# Patient Record
Sex: FEMALE | Race: BLACK OR AFRICAN AMERICAN | Employment: OTHER | URBAN - METROPOLITAN AREA
[De-identification: names, ages, dates, MRNs, and addresses within clinical notes are randomized per-mention and may not be internally consistent; named-entity substitution may affect disease eponyms.]

---

## 2017-06-14 ENCOUNTER — HOSPITAL ENCOUNTER (EMERGENCY)
Age: 82
Discharge: HOME | End: 2017-06-14
Attending: EMERGENCY MEDICINE
Payer: MEDICARE

## 2017-06-14 ENCOUNTER — APPOINTMENT (OUTPATIENT)
Dept: CT IMAGING | Age: 82
End: 2017-06-14
Attending: EMERGENCY MEDICINE
Payer: MEDICARE

## 2017-06-14 ENCOUNTER — APPOINTMENT (OUTPATIENT)
Dept: GENERAL RADIOLOGY | Age: 82
End: 2017-06-14
Attending: EMERGENCY MEDICINE
Payer: MEDICARE

## 2017-06-14 VITALS
SYSTOLIC BLOOD PRESSURE: 146 MMHG | OXYGEN SATURATION: 97 % | DIASTOLIC BLOOD PRESSURE: 65 MMHG | HEART RATE: 57 BPM | BODY MASS INDEX: 30.12 KG/M2 | HEIGHT: 63 IN | WEIGHT: 170 LBS | TEMPERATURE: 98.6 F | RESPIRATION RATE: 14 BRPM

## 2017-06-14 DIAGNOSIS — R53.82 CHRONIC FATIGUE: Primary | ICD-10-CM

## 2017-06-14 LAB
ALBUMIN SERPL BCP-MCNC: 2.9 G/DL (ref 3.2–4.6)
ALBUMIN/GLOB SERPL: 0.6 {RATIO} (ref 1.2–3.5)
ALP SERPL-CCNC: 116 U/L (ref 50–136)
ALT SERPL-CCNC: 23 U/L (ref 12–65)
ANION GAP BLD CALC-SCNC: 8 MMOL/L (ref 7–16)
APTT PPP: 25.9 SEC (ref 23.5–31.7)
AST SERPL W P-5'-P-CCNC: 36 U/L (ref 15–37)
ATRIAL RATE: 77 BPM
BASOPHILS # BLD AUTO: 0 K/UL (ref 0–0.2)
BASOPHILS # BLD: 0 % (ref 0–2)
BILIRUB SERPL-MCNC: 0.4 MG/DL (ref 0.2–1.1)
BUN SERPL-MCNC: 12 MG/DL (ref 8–23)
CALCIUM SERPL-MCNC: 8.9 MG/DL (ref 8.3–10.4)
CALCULATED P AXIS, ECG09: 86 DEGREES
CALCULATED R AXIS, ECG10: 51 DEGREES
CALCULATED T AXIS, ECG11: 52 DEGREES
CHLORIDE SERPL-SCNC: 102 MMOL/L (ref 98–107)
CO2 SERPL-SCNC: 28 MMOL/L (ref 21–32)
CREAT SERPL-MCNC: 0.7 MG/DL (ref 0.6–1)
DIAGNOSIS, 93000: NORMAL
DIFFERENTIAL METHOD BLD: ABNORMAL
EOSINOPHIL # BLD: 0.1 K/UL (ref 0–0.8)
EOSINOPHIL NFR BLD: 3 % (ref 0.5–7.8)
ERYTHROCYTE [DISTWIDTH] IN BLOOD BY AUTOMATED COUNT: 13.8 % (ref 11.9–14.6)
GLOBULIN SER CALC-MCNC: 5.2 G/DL (ref 2.3–3.5)
GLUCOSE SERPL-MCNC: 106 MG/DL (ref 65–100)
HCT VFR BLD AUTO: 34.4 % (ref 35.8–46.3)
HGB BLD-MCNC: 11.4 G/DL (ref 11.7–15.4)
IMM GRANULOCYTES # BLD: 0 K/UL (ref 0–0.5)
IMM GRANULOCYTES NFR BLD AUTO: 0.2 % (ref 0–5)
INR PPP: 1.1 (ref 0.9–1.2)
LYMPHOCYTES # BLD AUTO: 23 % (ref 13–44)
LYMPHOCYTES # BLD: 1.1 K/UL (ref 0.5–4.6)
MCH RBC QN AUTO: 30.6 PG (ref 26.1–32.9)
MCHC RBC AUTO-ENTMCNC: 33.1 G/DL (ref 31.4–35)
MCV RBC AUTO: 92.2 FL (ref 79.6–97.8)
MONOCYTES # BLD: 0.5 K/UL (ref 0.1–1.3)
MONOCYTES NFR BLD AUTO: 12 % (ref 4–12)
NEUTS SEG # BLD: 2.9 K/UL (ref 1.7–8.2)
NEUTS SEG NFR BLD AUTO: 62 % (ref 43–78)
P-R INTERVAL, ECG05: 144 MS
PHENOBARB SERPL-MCNC: 8.5 UG/ML (ref 15–40)
PHENYTOIN SERPL-MCNC: 19.9 UG/ML (ref 10–20)
PLATELET # BLD AUTO: 389 K/UL (ref 150–450)
PMV BLD AUTO: 9.6 FL (ref 10.8–14.1)
POTASSIUM SERPL-SCNC: 4.4 MMOL/L (ref 3.5–5.1)
PROT SERPL-MCNC: 8.1 G/DL (ref 6.3–8.2)
PROTHROMBIN TIME: 12 SEC (ref 9.6–12)
Q-T INTERVAL, ECG07: 358 MS
QRS DURATION, ECG06: 72 MS
QTC CALCULATION (BEZET), ECG08: 405 MS
RBC # BLD AUTO: 3.73 M/UL (ref 4.05–5.25)
SODIUM SERPL-SCNC: 138 MMOL/L (ref 136–145)
VENTRICULAR RATE, ECG03: 77 BPM
WBC # BLD AUTO: 4.7 K/UL (ref 4.3–11.1)

## 2017-06-14 PROCEDURE — 80053 COMPREHEN METABOLIC PANEL: CPT

## 2017-06-14 PROCEDURE — 71010 XR CHEST PORT: CPT

## 2017-06-14 PROCEDURE — 85025 COMPLETE CBC W/AUTO DIFF WBC: CPT

## 2017-06-14 PROCEDURE — 80184 ASSAY OF PHENOBARBITAL: CPT

## 2017-06-14 PROCEDURE — 99285 EMERGENCY DEPT VISIT HI MDM: CPT | Performed by: EMERGENCY MEDICINE

## 2017-06-14 PROCEDURE — 80185 ASSAY OF PHENYTOIN TOTAL: CPT

## 2017-06-14 PROCEDURE — 81003 URINALYSIS AUTO W/O SCOPE: CPT | Performed by: EMERGENCY MEDICINE

## 2017-06-14 PROCEDURE — 85610 PROTHROMBIN TIME: CPT

## 2017-06-14 PROCEDURE — 93005 ELECTROCARDIOGRAM TRACING: CPT | Performed by: EMERGENCY MEDICINE

## 2017-06-14 PROCEDURE — 70450 CT HEAD/BRAIN W/O DYE: CPT

## 2017-06-14 PROCEDURE — 85730 THROMBOPLASTIN TIME PARTIAL: CPT

## 2017-06-14 NOTE — ED PROVIDER NOTES
HPI Comments: 26-year-old female arrives in the emergency department via EMS from her home with multiple complaints. Her daughter is at the bedside as much of the history. Daughter states that in recent months the patient has had multiple visits to the [facility] emergency Department for weakness and fatigue. Daughter is frustrated that they have not figured out what's wrong. Tonight she awoke in the middle the night and felt dizzy and weak and then had a questionable syncopal spell. She then phoned her daughter this morning and was tearful and crying. She denies any head injury. She denies any current headache. She has been suffering a lot of chronic pain in her shoulders and arms which they thought was bursitis. She has been taking prednisone. She denies any chest pain or dyspnea. She denies nausea, vomiting, or diarrhea daughter states that the patient has not been eating well. She was noted to be bradycardic with a heart rate in the 50s by EMS and they administered some atropine. Patient is a [de-identified] y.o. female presenting with fatigue. The history is provided by the patient and a relative. Fatigue   This is a chronic problem. Pertinent negatives include no mental status change. There has been no fever. Past Medical History:   Diagnosis Date    Chronic pain     Hyperlipidemia     Seizures ([address]        Past Surgical History:   Procedure Laterality Date    HX CHOLECYSTECTOMY           History reviewed. No pertinent family history. Social History     Social History    Marital status:      Spouse name: N/A    Number of children: N/A    Years of education: N/A     Occupational History    Not on file.      Social History Main Topics    Smoking status: Never Smoker    Smokeless tobacco: Not on file    Alcohol use No    Drug use: Not on file    Sexual activity: Not on file     Other Topics Concern    Not on file     Social History Narrative    No narrative on file ALLERGIES: Review of patient's allergies indicates no known allergies. Review of Systems   Constitutional: Positive for fatigue. Negative for chills and fever. HENT: Negative. Eyes: Negative. Respiratory: Negative. Cardiovascular: Negative. Gastrointestinal: Negative. Endocrine: Negative. Genitourinary: Negative. Musculoskeletal: Negative. Neurological: Positive for dizziness, weakness and light-headedness. Negative for seizures. Vitals:    06/14/17 0852   BP: 173/81   Pulse: 70   Resp: 12   SpO2: 99%   Weight: 77.1 kg (170 lb)   Height: 5' 3\" (1.6 m)            Physical Exam   Constitutional: She is oriented to person, place, and time. She appears well-developed and well-nourished. HENT:   Head: Normocephalic and atraumatic. Eyes: EOM are normal. Pupils are equal, round, and reactive to light. Neck: Normal range of motion. Neck supple. Cardiovascular: Normal rate and regular rhythm. Pulmonary/Chest: Effort normal and breath sounds normal.   Abdominal: Soft. Bowel sounds are normal. There is no tenderness. Neurological: She is alert and oriented to person, place, and time. She has normal reflexes. Generalized weakness with no focal deficits   Skin: Skin is warm and dry. No rash noted. Nursing note and vitals reviewed.        MDM  Number of Diagnoses or Management Options  Diagnosis management comments: 73-year-old female with  Weakness, dizziness, and syncope versus near syncope    Differential diagnosis includes stroke, TIA, arrhythmia, anemia, infection, dehydration, renal failure, metabolic disturbance    EKG shows normal sinus rhythm with no acute ischemic changes         Amount and/or Complexity of Data Reviewed  Clinical lab tests: ordered and reviewed  Tests in the radiology section of CPT®: ordered and reviewed  Review and summarize past medical records: yes  Independent visualization of images, tracings, or specimens: yes    Risk of Complications, Morbidity, and/or Mortality  Presenting problems: moderate  Diagnostic procedures: moderate  Management options: moderate    Patient Progress  Patient progress: stable    ED Course     11:39 AM  Evaluation here in the ER with CAT scan head, chest x-ray, urinalysis, blood and EKG is all unremarkable. I discussed all these results with the patient and her daughter. Daughter is going to try and arrange for a new primary care physician. 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 They're comfortable with discharge to home. Voice dictation software was used during the making of this note. This software is not perfect and grammatical and other typographical errors may be present. This note has been proofread, but may still contain errors.   Tiff Dobbs MD; 6/14/2017 @11:39 AM   ===================================================================      Procedures

## 2017-06-14 NOTE — ED TRIAGE NOTES
Pt arrive via EMS coming from home, c/o weakness and dizziness that started this morning around 0300 or 0400 am. Generalized pain all over x2 months. Pt initial HR 60s and then dropped down to 50. 22 G L hand. EMS gave 0.5 mg atropine, positive response with HR going up to 70s and decreasing weakness and dizziness. /60.

## 2021-04-06 ENCOUNTER — HOSPITAL ENCOUNTER (EMERGENCY)
Age: 86
Discharge: HOME | End: 2021-04-06
Payer: MEDICARE

## 2021-04-06 ENCOUNTER — APPOINTMENT (OUTPATIENT)
Dept: ULTRASOUND IMAGING | Age: 86
End: 2021-04-06
Attending: PHYSICIAN ASSISTANT
Payer: MEDICARE

## 2021-04-06 VITALS
DIASTOLIC BLOOD PRESSURE: 80 MMHG | HEART RATE: 59 BPM | BODY MASS INDEX: 30.12 KG/M2 | HEIGHT: 63 IN | RESPIRATION RATE: 20 BRPM | OXYGEN SATURATION: 98 % | TEMPERATURE: 97.9 F | WEIGHT: 170 LBS | SYSTOLIC BLOOD PRESSURE: 160 MMHG

## 2021-04-06 DIAGNOSIS — M25.50 POLYARTHRALGIA: Primary | ICD-10-CM

## 2021-04-06 PROCEDURE — 99283 EMERGENCY DEPT VISIT LOW MDM: CPT

## 2021-04-06 PROCEDURE — 96372 THER/PROPH/DIAG INJ SC/IM: CPT

## 2021-04-06 PROCEDURE — 74011250636 HC RX REV CODE- 250/636: Performed by: PHYSICIAN ASSISTANT

## 2021-04-06 PROCEDURE — 74011250637 HC RX REV CODE- 250/637: Performed by: PHYSICIAN ASSISTANT

## 2021-04-06 PROCEDURE — 93971 EXTREMITY STUDY: CPT

## 2021-04-06 RX ORDER — ACETAMINOPHEN 500 MG
1000 TABLET ORAL ONCE
Status: COMPLETED | OUTPATIENT
Start: 2021-04-06 | End: 2021-04-06

## 2021-04-06 RX ORDER — KETOROLAC TROMETHAMINE 15 MG/ML
15 INJECTION, SOLUTION INTRAMUSCULAR; INTRAVENOUS
Status: COMPLETED | OUTPATIENT
Start: 2021-04-06 | End: 2021-04-06

## 2021-04-06 RX ORDER — ACETAMINOPHEN 325 MG/1
650 TABLET ORAL
Qty: 20 TAB | Refills: 0 | Status: SHIPPED | OUTPATIENT
Start: 2021-04-06 | End: 2021-05-06

## 2021-04-06 RX ORDER — NAPROXEN 375 MG/1
375 TABLET ORAL 2 TIMES DAILY WITH MEALS
Qty: 60 TAB | Refills: 0 | Status: SHIPPED | OUTPATIENT
Start: 2021-04-06 | End: 2021-05-06

## 2021-04-06 RX ADMIN — KETOROLAC TROMETHAMINE 15 MG: 15 INJECTION, SOLUTION INTRAMUSCULAR; INTRAVENOUS at 12:04

## 2021-04-06 RX ADMIN — ACETAMINOPHEN 1000 MG: 500 TABLET, FILM COATED ORAL at 12:04

## 2021-04-06 NOTE — ED NOTES
Pt reports extremity pain for a month. Gets shots in her joints some for this pain. Hx of arthritis. NAD. Masked.

## 2021-04-06 NOTE — ED PROVIDER NOTES
Medical Screening Examination  CC:   Chief Complaint   Patient presents with    Pain (Chronic)     Brief HPI: Patient presents emergency department for left lower leg pain for a few days. Denies any injury denies any history of DVT. Denies any chest pain or shortness of breath. Denies any history of contact with anybody with known positive Covid. Denies any symptoms of Covid. PE: Patient is alert and oriented x3 able speak in full sentences. Lungs are clear to auscultation bilaterally. Heart sounds normal.  Lower extremity exam: Left lower extremity: No signs of swelling no point tenderness no signs of DVT. No discoloration of the leg. No signs of infectious process. Tenderness palpation over the left lateral soleus. Plan is to obtain ultrasound left lower extremity. I have performed a rapid medical evaluation on the patient. Orders initiated and communicated with the nursing staff.    ---------------------------------------------------------------------------------------------------------------------      [address]   Chief Complaint   Patient presents with    Pain (Chronic)         HISTORY OF PRESENT ILLNESS    81 y.o. female with a history of seizure disorder, hyperlipidemia, osteoarthritis presents for evaluation of diffuse joint pain. Patient reports she deals with chronic arthritis in her shoulders and knees. Patient believes pain is secondary to this. She otherwise denies any recent trauma or fall at home. Patient reports she recently received a shot from her doctor last week, which did improve her symptoms. Patient reports she woke up this morning with a lot of pain, prompting arrival to ED today. Pain is since improved since waking up. Located to bilateral shoulders, knees. Described as achy, rated 2/10. She denies any fevers, redness, joint swelling, chest pain, shortness of breath.   Denies taking any pain medications today. History provided by patient. PCP is Babar Samano MD      PAST MEDICAL HISTORY  Past Medical History:   Diagnosis Date    Chronic pain     Hyperlipidemia     Seizures ([address]          PAST SURGICAL HISTORY  Past Surgical History:   Procedure Laterality Date    HX CHOLECYSTECTOMY           FAMILY HISTORY  History reviewed. No pertinent family history. SOCIAL HISTORY  Social History     Socioeconomic History    Marital status:      Spouse name: Not on file    Number of children: Not on file    Years of education: Not on file    Highest education level: Not on file   Tobacco Use    Smoking status: Never Smoker   Substance and Sexual Activity    Alcohol use: No         ALLERGIES  No Known Allergies      ----------------------------------------------------------------------------------------------------------------------    REVIEW OF SYSTEMS (ROS):    General: No fever, weight loss  Skin: No rash, diaphoresis, pallor  Eyes: No vision problems, pain  ENT: No sore throat, ear pain  Neck: No pain or stiffness  Respiratory: No shortness of breath, cough  Cardiac: No chest pain, palpitations  Gastrointestinal: No nausea, vomiting, or abdominal pain  Genitourinary: No dysuria  Musculoskeletal: Arthralgias  Neurologic: No headache or dizziness    All other systems reviewed and are negative, unless otherwise stated in HPI. PHYSICAL EXAM  Visit Vitals  BP (!) 160/80 (BP 1 Location: Left upper arm, BP Patient Position: At rest)   Pulse (!) 59   Temp 97.9 °F (36.6 °C)   Resp 20   Ht 5' 3\" (1.6 m)   Wt 77.1 kg (170 lb)   SpO2 98%   BMI 30.11 kg/m²       Pulse ox shows SpO2 of 98%, which is interpreted as normal.    General Appearance: No acute distress, appears comfortable  Skin: No rash  HEENT: Normocephalic/atraumatic, sclera anicteric, mucous membranes moist  Neck: Normal range of motion.   No meningeal signs present  Chest and Lungs: Bilateral breath sounds, clear to auscultation. No wheezing, rhonchi, or rales  Cardiovascular: Regular rate and rhythm, no murmur. 2+ distal pulses noted bilaterally  Abdomen: Soft, nontender. No rigidity, guarding, rebound. Bowel sounds normoactive x4  Musculoskeletal: No edema or tenderness  Neurologic: Awake, alert, no obvious deficits. Moving all extremities freely. Sensation intact  Psychiatric: Appropriate, cooperative    Nursing/triage note and vital signs reviewed. Vitals:    04/06/21 0954   BP: (!) 160/80   Pulse: (!) 59   Resp: 20   Temp: 97.9 °F (36.6 °C)   SpO2: 98%   Weight: 77.1 kg (170 lb)   Height: 5' 3\" (1.6 m)     ----------------------------------------------------------------------------------------------------------------------  ED COURSE    Medical Decision Making (MDM):  81 y.o. female presents for evaluation of bilateral shoulder/knee pain x1 day. Patient with history of chronic osteoarthritis. Denies any recent injury, trauma, falls, fevers. Physical exam grossly unremarkable. Shoulders, knees without focal tenderness, warmth, redness. Full active range of motion noted. Initial ED Plan of Care: Pain control      Differential Diagnosis (including but not limited to):  Chronic osteoarthritis  DVT  Fracture, unlikely    -------------------------------------------------------------------------------------------------------------------    Imaging Results:  DUPLEX LOWER EXT VENOUS LEFT   Final Result   Negative for sonographic evidence of deep venous thrombosis left   lower extremity. ? Medications Administered:  Medications   ketorolac (TORADOL) injection 15 mg (15 mg IntraMUSCular Given 4/6/21 1204)   acetaminophen (TYLENOL) tablet 1,000 mg (1,000 mg Oral Given 4/6/21 1204)       MDM con't:    Given presentation and exam findings, ice patient for pain secondary to underlying osteoarthritis. Doppler ultrasound negative for acute DVT.   Proceeded with dose of Toradol, Tylenol, which provided moderate relief. Will discharge with supportive measures, with instructions to follow-up with PCP and orthopedic specialist for evaluation.    ----------------------------------------------------------------------------------------------------------------------    Preliminary Clinical Impression  1. Polyarthralgia          Plan  -Follow up with PCP, Ortho in 2-3 days  -Patient instructed and strongly encouraged to return immediately to the ED Department for persistent, recurrent or worsening symptoms. I have discussed the ED workup, working diagnosis, and plan of care with the patient. Instructions to notify the Primary Care Provider and arrange additional follow-up were advised. Written aftercare and follow-up instructions were discussed with and verbally acknowledged by the patient. The patient was given the opportunity to ask questions, and any concerns raised were addressed accordingly. I attest that I have seen and treated this patient while Dr. Dago Syed was the supervising physician in the Emergency Department. Parts of this note were written with the assistance of dictation software.

## 2021-04-06 NOTE — DISCHARGE INSTRUCTIONS
-Ultrasound performed today did not reveal evidence of a blood clot in your legs. Pain is likely due to your underlying arthritis. Will discharge with a 1 month trial of naproxen which is an anti-inflammatory medicine, and Tylenol.   Follow-up with your primary care doctor, orthopedic specialist as scheduled

## 2021-04-06 NOTE — ED PROVIDER NOTES
Medical Screening Examination CC:  
Chief Complaint Patient presents with  Pain (Chronic) Brief HPI: Patient presents emergency department for left lower leg pain for a few days. Denies any injury denies any history of DVT. Denies any chest pain or shortness of breath. Denies any history of contact with anybody with known positive Covid. Denies any symptoms of Covid. PE: Patient is alert and oriented x3 able speak in full sentences. Lungs are clear to auscultation bilaterally. Heart sounds normal. 
Lower extremity exam: Left lower extremity: No signs of swelling no point tenderness no signs of DVT. No discoloration of the leg. No signs of infectious process. Tenderness palpation over the left lateral soleus. Plan is to obtain ultrasound left lower extremity. I have performed a rapid medical evaluation on the patient. Orders initiated and communicated with the nursing staff.

## 2021-04-06 NOTE — ED NOTES
I have reviewed discharge instructions with the patient. The patient verbalized understanding. Patient left ED via Discharge Method: ambulatory to Home with self    Opportunity for questions and clarification provided. Patient given 2 scripts. To continue your aftercare when you leave the hospital, you may receive an automated call from our care team to check in on how you are doing. This is a free service and part of our promise to provide the best care and service to meet your aftercare needs.  If you have questions, or wish to unsubscribe from this service please call [phone]. Thank you for Choosing our [facility] Emergency Department.